# Patient Record
(demographics unavailable — no encounter records)

---

## 2024-10-07 NOTE — HISTORY OF PRESENT ILLNESS
[de-identified] : DYSURIA X 1 WEEK. PT REPORTS NOTICING BLOOD IN URINE TODAY. LMP 9/22 [FreeTextEntry6] : Sexually active; pt reports 1 partner and declines chance of STD or testing.

## 2024-10-07 NOTE — DISCUSSION/SUMMARY
[FreeTextEntry1] : UA in office with small blood and leukocytes.  Urine culture sent to lab, will f/u by phone when results are available. May start antibiotics and d/c if culture is negative.  Increase fluid intake. Use of bubble baths and bath bombs discouraged. Follow up as needed for persistent or worsening symptoms.

## 2025-04-16 NOTE — HISTORY OF PRESENT ILLNESS
[de-identified] : Dysuria, hematuria, and intermittent pelvic pain. Symptoms x2 days [FreeTextEntry6] : started with buring and urgency to urination gets UTIs 1-2 x a year is sexually active and does do post coital void no vomiting/diarrhea no back or belly pain stooling normally urine looks like there is some blood in it no vaginal pain or discharge

## 2025-04-16 NOTE — PLAN
[TextEntry] : Symptomatic treatment of fever and/or pain discussed Insure adequate hydration Handwashing and infection control discussed Return to office if febrile > 48 hours or if symptoms get worse Go to ER if unable to come to the office or during after hours, parent encouraged to call service first before doing so. Discussed perineal hygiene and genital area cleaning Follow up per UTI protocol Urine culture done,  if POSITIVE, continue abx started based on clinical suspicion and UA

## 2025-04-16 NOTE — PHYSICAL EXAM
[TextEntry] : General: awake, alert, cooperative, appropriate, no acute distress Head: no signs injury Eyes: EOMI, PERRL, no discharge, no conjunctival or scleral erythema  Nose: no rhinnorhea Mouth: mucosa moist and pink, no erythema to the oropharynx, no vesicles, exudates, lesions or soft palate petechiae Neck: supple, good range of motion Lungs: clear to auscultation bilaterally  Cardiac: normal S1 S2, regular rate and rhythm Abdomen: soft, non tender, non distended, negative hop test, no CVA tenderness Lymphatics: no cervical lymphadenopathy, no pre or post auricular lymphadenopathy, no occipital lymphadenopathy Skin: no rash